# Patient Record
Sex: MALE | Race: WHITE | Employment: FULL TIME | URBAN - METROPOLITAN AREA
[De-identification: names, ages, dates, MRNs, and addresses within clinical notes are randomized per-mention and may not be internally consistent; named-entity substitution may affect disease eponyms.]

---

## 2021-03-18 ENCOUNTER — OFFICE VISIT (OUTPATIENT)
Dept: FAMILY MEDICINE CLINIC | Facility: CLINIC | Age: 31
End: 2021-03-18

## 2021-03-18 ENCOUNTER — TELEPHONE (OUTPATIENT)
Dept: FAMILY MEDICINE CLINIC | Facility: CLINIC | Age: 31
End: 2021-03-18

## 2021-03-18 VITALS
HEIGHT: 68 IN | BODY MASS INDEX: 27.58 KG/M2 | WEIGHT: 182 LBS | TEMPERATURE: 98 F | SYSTOLIC BLOOD PRESSURE: 136 MMHG | DIASTOLIC BLOOD PRESSURE: 76 MMHG | RESPIRATION RATE: 17 BRPM | HEART RATE: 94 BPM | OXYGEN SATURATION: 100 %

## 2021-03-18 DIAGNOSIS — Z13.0 SCREENING FOR ENDOCRINE, NUTRITIONAL, METABOLIC AND IMMUNITY DISORDER: ICD-10-CM

## 2021-03-18 DIAGNOSIS — R41.840 ATTENTION DEFICIT: Primary | ICD-10-CM

## 2021-03-18 DIAGNOSIS — Z13.228 SCREENING FOR ENDOCRINE, NUTRITIONAL, METABOLIC AND IMMUNITY DISORDER: ICD-10-CM

## 2021-03-18 DIAGNOSIS — Z13.29 SCREENING FOR ENDOCRINE, NUTRITIONAL, METABOLIC AND IMMUNITY DISORDER: ICD-10-CM

## 2021-03-18 DIAGNOSIS — Z13.21 SCREENING FOR ENDOCRINE, NUTRITIONAL, METABOLIC AND IMMUNITY DISORDER: ICD-10-CM

## 2021-03-18 PROCEDURE — 3078F DIAST BP <80 MM HG: CPT | Performed by: EMERGENCY MEDICINE

## 2021-03-18 PROCEDURE — 3075F SYST BP GE 130 - 139MM HG: CPT | Performed by: EMERGENCY MEDICINE

## 2021-03-18 PROCEDURE — 3008F BODY MASS INDEX DOCD: CPT | Performed by: EMERGENCY MEDICINE

## 2021-03-18 PROCEDURE — 99204 OFFICE O/P NEW MOD 45 MIN: CPT | Performed by: EMERGENCY MEDICINE

## 2021-03-18 RX ORDER — DEXTROAMPHETAMINE SACCHARATE, AMPHETAMINE ASPARTATE MONOHYDRATE, DEXTROAMPHETAMINE SULFATE AND AMPHETAMINE SULFATE 7.5; 7.5; 7.5; 7.5 MG/1; MG/1; MG/1; MG/1
30 CAPSULE, EXTENDED RELEASE ORAL EVERY MORNING
COMMUNITY

## 2021-03-18 RX ORDER — BUPROPION HYDROCHLORIDE 150 MG/1
150 TABLET, EXTENDED RELEASE ORAL 2 TIMES DAILY
Qty: 60 TABLET | Refills: 1 | Status: SHIPPED | OUTPATIENT
Start: 2021-03-18

## 2021-03-18 NOTE — PATIENT INSTRUCTIONS
Thank you for choosing [facility]  To Do:  FOR MAGDA SALAZAR        1. Arrange for neuro Psyche testing  2. Have blood tests done  3. Follow up after neuro psyche testing for physical and discussion of Sx  4. Bring wife on next visit  5.  Start [facility] that signal a person to control behavior or pay attention aren’t passed along. As a result, traits common to ADHD may occur. Remember your child’s strengths  Children with ADHD can be challenging to raise.  Because of this, it’s easy to overlook their goo

## 2021-03-18 NOTE — PROGRESS NOTES
Chief Complaint:   Patient presents with:  Establish Care: NP. Med refill, Adderall    HPI:   This is a 26year old male     ADHD    Was Rx'd adderal in the past. Unclear diagnosis. Was rx'd adderal 5 years ago. Took adderal for a few years.  Has not haley extraneous stimuli  ON occassion    Often forgetful in daily activities  YES    Often has difficulty playing or engaging in leisure activities quietly  YES    HYPERACTIVITY-IMPULSIVITY  (6/9)     Often fidgets with hands or feet or squirms in seat  no    O above    PHYSICAL EXAM:   /76   Pulse 94   Temp 98.2 °F (36.8 °C) (Temporal)   Resp 17   Ht 5' 8\" (1.727 m)   Wt 182 lb (82.6 kg)   SpO2 100%   BMI 27.67 kg/m²  Estimated body mass index is 27.67 kg/m² as calculated from the following:    Height as LIPID PANEL; Future  - TSH W REFLEX TO FREE T4; Future        PATIENT INSTRUCTIONS:        1. Arrange for neuro Psyche testing  2. Have blood tests done  3.  Follow up after neuro psyche testing for physical and discussion of Sx  4. Bring wife on next visit

## 2021-07-23 ENCOUNTER — HOSPITAL ENCOUNTER (EMERGENCY)
Age: 31
Discharge: HOME | End: 2021-07-23
Attending: EMERGENCY MEDICINE
Payer: OTHER MISCELLANEOUS

## 2021-07-23 VITALS
TEMPERATURE: 98 F | RESPIRATION RATE: 16 BRPM | WEIGHT: 170 LBS | OXYGEN SATURATION: 98 % | SYSTOLIC BLOOD PRESSURE: 136 MMHG | HEIGHT: 68 IN | DIASTOLIC BLOOD PRESSURE: 83 MMHG | HEART RATE: 79 BPM | BODY MASS INDEX: 25.76 KG/M2

## 2021-07-23 DIAGNOSIS — S41.111A LACERATION OF ARM, RIGHT, INITIAL ENCOUNTER: Primary | ICD-10-CM

## 2021-07-23 PROCEDURE — 12011 RPR F/E/E/N/L/M 2.5 CM/<: CPT

## 2021-07-23 PROCEDURE — 99282 EMERGENCY DEPT VISIT SF MDM: CPT

## 2021-07-23 PROCEDURE — 99283 EMERGENCY DEPT VISIT LOW MDM: CPT

## 2021-07-23 NOTE — ED PROVIDER NOTES
Patient Seen in: THE [facility] Emergency Department In [place]      History   Patient presents with:  Laceration/Abrasion    Stated Complaint: cut right forearm on drill bit.  unknown tetanus    HPI/Subjective:   HPI    This is a 33 male right-handed who pres nylon, 5 sutures were placed. The quality of the closure was excellent. Light dressing was applied. Suture removal 12 to 14 days. Ibuprofen or Tylenol for pain. Return for any problems. Patient discharged home in good condition.                 Disp

## 2023-02-24 ENCOUNTER — OFFICE VISIT (OUTPATIENT)
Dept: FAMILY MEDICINE CLINIC | Facility: CLINIC | Age: 33
End: 2023-02-24
Payer: COMMERCIAL

## 2023-02-24 VITALS
HEART RATE: 75 BPM | DIASTOLIC BLOOD PRESSURE: 72 MMHG | BODY MASS INDEX: 28.79 KG/M2 | SYSTOLIC BLOOD PRESSURE: 130 MMHG | TEMPERATURE: 98 F | HEIGHT: 68 IN | OXYGEN SATURATION: 100 % | WEIGHT: 190 LBS | RESPIRATION RATE: 18 BRPM

## 2023-02-24 DIAGNOSIS — M54.50 ACUTE RIGHT-SIDED LOW BACK PAIN WITHOUT SCIATICA: Primary | ICD-10-CM

## 2023-02-24 PROCEDURE — 3008F BODY MASS INDEX DOCD: CPT | Performed by: NURSE PRACTITIONER

## 2023-02-24 PROCEDURE — 99213 OFFICE O/P EST LOW 20 MIN: CPT | Performed by: NURSE PRACTITIONER

## 2023-02-24 PROCEDURE — 3075F SYST BP GE 130 - 139MM HG: CPT | Performed by: NURSE PRACTITIONER

## 2023-02-24 PROCEDURE — 3078F DIAST BP <80 MM HG: CPT | Performed by: NURSE PRACTITIONER

## 2023-02-24 RX ORDER — NAPROXEN 500 MG/1
500 TABLET ORAL 2 TIMES DAILY WITH MEALS
Qty: 28 TABLET | Refills: 0 | Status: SHIPPED | OUTPATIENT
Start: 2023-02-24 | End: 2023-03-10

## 2023-02-24 RX ORDER — CYCLOBENZAPRINE HCL 10 MG
10 TABLET ORAL 3 TIMES DAILY PRN
Qty: 6 TABLET | Refills: 0 | Status: SHIPPED | OUTPATIENT
Start: 2023-02-24

## 2023-02-24 NOTE — PATIENT INSTRUCTIONS
You can continue to apply heat for 10-15 minutes at a time 3x daily  Naproxen as prescribed. You can take muscle relaxer as prescribed, be aware it may cause sedation do not drive or drink alcohol while on muscle relaxer.

## 2024-09-30 ENCOUNTER — OFFICE VISIT (OUTPATIENT)
Dept: FAMILY MEDICINE CLINIC | Facility: CLINIC | Age: 34
End: 2024-09-30
Payer: COMMERCIAL

## 2024-09-30 VITALS
WEIGHT: 179 LBS | HEART RATE: 68 BPM | DIASTOLIC BLOOD PRESSURE: 72 MMHG | RESPIRATION RATE: 16 BRPM | BODY MASS INDEX: 27.13 KG/M2 | OXYGEN SATURATION: 97 % | SYSTOLIC BLOOD PRESSURE: 106 MMHG | HEIGHT: 68 IN | TEMPERATURE: 98 F

## 2024-09-30 DIAGNOSIS — M54.41 ACUTE RIGHT-SIDED LOW BACK PAIN WITH RIGHT-SIDED SCIATICA: Primary | ICD-10-CM

## 2024-09-30 RX ORDER — CYCLOBENZAPRINE HCL 5 MG
5 TABLET ORAL 3 TIMES DAILY PRN
Qty: 15 TABLET | Refills: 0 | Status: SHIPPED | OUTPATIENT
Start: 2024-09-30 | End: 2024-10-05

## 2024-09-30 RX ORDER — NAPROXEN SODIUM 550 MG/1
550 TABLET ORAL 2 TIMES DAILY WITH MEALS
Qty: 28 TABLET | Refills: 0 | Status: SHIPPED | OUTPATIENT
Start: 2024-09-30 | End: 2024-10-14

## 2024-09-30 NOTE — PROGRESS NOTES
CHIEF COMPLAINT:     Chief Complaint   Patient presents with    Other     2 weeks back pain, flared up out of the blue, 6/10 pain, most noticeable while sitting or bending over  On and off for years  OTC none       HPI:   Enrique Cárdenas Jr is a 34 year old male who is here for complaints of right low back pain.  Pain is located at right low back. Pain is described as aching, sharp, shooting, throbbing. Severity varies. The pain radiates to  sometimes up right side of back, sometimes to right hip and groin area . Pain was precipitated by unknown. Has had for 8  weeks off and on.  Patient did do a 6 week program at the chiropractor and was feeling a little better and the past 2 weeks symptoms have worsened.   Pain is worsened by bending, twisting, stairs, lifting. Gets relief of pain with  standing . Prior + pain hx: recurrent self limited episodes of low back pain in the past.   Denies felisa loss of bowel or bladder control.    Current Outpatient Medications   Medication Sig Dispense Refill    cyclobenzaprine 5 MG Oral Tab Take 1 tablet (5 mg total) by mouth 3 (three) times daily as needed for Muscle spasms. 15 tablet 0    Naproxen Sodium 550 MG Oral Tab Take 1 tablet (550 mg total) by mouth 2 (two) times daily with meals for 14 days. 28 tablet 0    Amphetamine-Dextroamphet ER 30 MG Oral Capsule SR 24 Hr Take 30 mg by mouth every morning. (Patient not taking: Reported on 2/24/2023)      buPROPion HCl ER, SR, 150 MG Oral Tablet 12 Hr Take 1 tablet (150 mg total) by mouth 2 (two) times daily. Start with 1 tablet daily for 3 -4 d, then 1 tab twice a day (Patient not taking: Reported on 2/24/2023) 60 tablet 1      No past medical history on file.   Social History:  Social History     Socioeconomic History    Marital status:    Tobacco Use    Smoking status: Never    Smokeless tobacco: Never   Vaping Use    Vaping status: Never Used   Substance and Sexual Activity    Alcohol use: Yes     Comment: Rarely    Drug  use: Yes     Types: Cannabis     Comment: Rarely        REVIEW OF SYSTEMS:   GENERAL: feels well otherwise  SKIN: denies any unusual skin lesions  LUNGS: denies shortness of breath   CARDIOVASCULAR: denies chest pain or palpitations  GI: denies abdominal pain, N/VC/D.  Denies heartburn  : no dysuria, urgency or flank pain.  MUSCULOSKELETAL: Per HPI.  No other joints are affected  NEURO: No numbness or tingling.  No loss of bowel or bladder control.    EXAM:   /72   Pulse 68   Temp 98 °F (36.7 °C)   Resp 16   Ht 5' 8\" (1.727 m)   Wt 179 lb (81.2 kg)   SpO2 97%   BMI 27.22 kg/m²    GENERAL: well developed, well nourished,in no apparent distress  SKIN: no rashes,no suspicious lesions  NECK: supple,no adenopathy,no bruits  LUNGS: clear to auscultation  CARDIO: RRR without murmur  GI: normoactive bs x4, no masses, HSM or tenderness  EXTREMITIES: no cyanosis, clubbing or edema  BACK: is able to flex 75 degrees, DTR's are 2+ bilaterally, strength is 5+/5, sensation is intact, pt is able to toe and heel walk without difficulty  NEURO:  DTR's intact and equal bilaterally.  Sensation intact.    ASSESSMENT:   Enrique Cárdenas Jr is a 34 year old male who presents with complaints of right low back pain   Findings are consistent with   Encounter Diagnosis   Name Primary?    Acute right-sided low back pain with right-sided sciatica Yes         PLAN:   Comfort care as listed in patient instructions as well as Naproxen Sodium 550 mg bid for ten days, rest, ice, a muscle relaxer, no lifting, pushing or pulling Medication as below.  Advised establishing care with PCP for further evaluation.   Requested Prescriptions     Signed Prescriptions Disp Refills    cyclobenzaprine 5 MG Oral Tab 15 tablet 0     Sig: Take 1 tablet (5 mg total) by mouth 3 (three) times daily as needed for Muscle spasms.    Naproxen Sodium 550 MG Oral Tab 28 tablet 0     Sig: Take 1 tablet (550 mg total) by mouth 2 (two) times daily with meals for 14  days.     Risks, benefits, side effects of medication explained and discussed.     Patient Instructions   Follow up with PCP for further evaluation    The patient indicates understanding of these issues and agrees to the plan.  The patient is asked to return if sx's persist or worsen.